# Patient Record
Sex: FEMALE | URBAN - METROPOLITAN AREA
[De-identification: names, ages, dates, MRNs, and addresses within clinical notes are randomized per-mention and may not be internally consistent; named-entity substitution may affect disease eponyms.]

---
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
## 2024-06-20 ENCOUNTER — APPOINTMENT (OUTPATIENT)
Dept: URBAN - METROPOLITAN AREA CLINIC 242 | Age: 24
Setting detail: DERMATOLOGY
End: 2024-06-20

## 2024-06-20 DIAGNOSIS — L91.0 HYPERTROPHIC SCAR: ICD-10-CM

## 2024-06-20 PROCEDURE — OTHER INTRALESIONAL KENALOG: OTHER

## 2024-06-20 PROCEDURE — 99202 OFFICE O/P NEW SF 15 MIN: CPT | Mod: 25

## 2024-06-20 PROCEDURE — OTHER COUNSELING: OTHER

## 2024-06-20 PROCEDURE — 11900 INJECT SKIN LESIONS </W 7: CPT

## 2024-06-20 ASSESSMENT — LOCATION SIMPLE DESCRIPTION DERM: LOCATION SIMPLE: RIGHT FOREARM

## 2024-06-20 ASSESSMENT — LOCATION ZONE DERM: LOCATION ZONE: ARM

## 2024-06-20 ASSESSMENT — LOCATION DETAILED DESCRIPTION DERM: LOCATION DETAILED: RIGHT VENTRAL DISTAL FOREARM

## 2024-06-20 NOTE — PROCEDURE: INTRALESIONAL KENALOG
How Many Mls Were Removed From The 80 Mg/Ml (5ml) Vial When Preparing The Injectable Solution?: 0
Detail Level: Detailed
Medical Necessity Clause: This procedure was medically necessary because the lesions that were treated were:
Total Volume (Ccs): 0.4
Include Z78.9 (Other Specified Conditions Influencing Health Status) As An Associated Diagnosis?: No
Consent: The risks of atrophy were reviewed with the patient.
Lot # For Kenalog (Optional): [id number]
Kenalog Type Of Vial: Multiple Dose
Ndc# For Kenalog Only: 04115-182-42
Expiration Date For Kenalog (Optional): 2025-09
Show Inventory Tab: Hide
Concentration Of Kenalog Solution Injected (Mg/Ml): 40.0
Validate Note Data When Using Inventory: Yes
Kenalog Preparation: Kenalog

## 2024-07-30 ENCOUNTER — APPOINTMENT (OUTPATIENT)
Dept: URBAN - METROPOLITAN AREA CLINIC 242 | Age: 24
Setting detail: DERMATOLOGY
End: 2024-07-30

## 2024-07-30 DIAGNOSIS — L91.0 HYPERTROPHIC SCAR: ICD-10-CM

## 2024-07-30 PROCEDURE — 11900 INJECT SKIN LESIONS </W 7: CPT

## 2024-07-30 PROCEDURE — 99212 OFFICE O/P EST SF 10 MIN: CPT | Mod: 25

## 2024-07-30 PROCEDURE — OTHER MIPS QUALITY: OTHER

## 2024-07-30 PROCEDURE — OTHER INTRALESIONAL KENALOG: OTHER

## 2024-07-30 PROCEDURE — OTHER COUNSELING: OTHER

## 2024-07-30 ASSESSMENT — LOCATION SIMPLE DESCRIPTION DERM: LOCATION SIMPLE: RIGHT FOREARM

## 2024-07-30 ASSESSMENT — LOCATION DETAILED DESCRIPTION DERM: LOCATION DETAILED: RIGHT VENTRAL DISTAL FOREARM

## 2024-07-30 ASSESSMENT — LOCATION ZONE DERM: LOCATION ZONE: ARM

## 2024-07-30 NOTE — PROCEDURE: INTRALESIONAL KENALOG
How Many Mls Were Removed From The 80 Mg/Ml (5ml) Vial When Preparing The Injectable Solution?: 0
Administered By (Optional): Dr. Holbrook
Detail Level: Detailed
Medical Necessity Clause: This procedure was medically necessary because the lesions that were treated were:
Total Volume (Ccs): 0.2
Include Z78.9 (Other Specified Conditions Influencing Health Status) As An Associated Diagnosis?: No
Consent: The risks of atrophy were reviewed with the patient.
Lot # For Kenalog (Optional): [id number]
Kenalog Type Of Vial: Multiple Dose
Ndc# For Kenalog Only: 52280-157-76
Expiration Date For Kenalog (Optional): 2025-09
Show Inventory Tab: Hide
Concentration Of Kenalog Solution Injected (Mg/Ml): 40.0
Validate Note Data When Using Inventory: Yes
Kenalog Preparation: Kenalog

## 2024-08-01 ENCOUNTER — HOSPITAL ENCOUNTER (OUTPATIENT)
Age: 24
Discharge: HOME | End: 2024-08-01
Payer: COMMERCIAL

## 2024-08-01 VITALS
RESPIRATION RATE: 14 BRPM | OXYGEN SATURATION: 97 % | DIASTOLIC BLOOD PRESSURE: 59 MMHG | HEART RATE: 79 BPM | TEMPERATURE: 98 F | HEIGHT: 62 IN | SYSTOLIC BLOOD PRESSURE: 100 MMHG | BODY MASS INDEX: 20.24 KG/M2 | WEIGHT: 110 LBS

## 2024-08-01 DIAGNOSIS — A09 TRAVELER'S DIARRHEA: Primary | ICD-10-CM

## 2024-08-01 LAB
#MXD IC: 0.7 X10ˆ3/UL (ref 0.1–1)
B-HCG UR QL: NEGATIVE
BILIRUB UR QL STRIP: NEGATIVE
BUN BLD-MCNC: <5 MG/DL (ref 7–18)
CHLORIDE BLD-SCNC: 102 MMOL/L (ref 98–112)
CLARITY UR: CLEAR
CO2 BLD-SCNC: 23 MMOL/L (ref 21–32)
COLOR UR: YELLOW
CREAT BLD-MCNC: 0.6 MG/DL
EGFRCR SERPLBLD CKD-EPI 2021: 130 ML/MIN/1.73M2 (ref 60–?)
GLUCOSE BLD-MCNC: 86 MG/DL (ref 70–99)
GLUCOSE UR STRIP-MCNC: NEGATIVE MG/DL
HCT VFR BLD AUTO: 42.4 %
HCT VFR BLD CALC: 43 %
HGB BLD-MCNC: 13.7 G/DL
HGB UR QL STRIP: NEGATIVE
ISTAT IONIZED CALCIUM FOR CHEM 8: 1.19 MMOL/L (ref 1.12–1.32)
KETONES UR STRIP-MCNC: NEGATIVE MG/DL
LYMPHOCYTES # BLD AUTO: 2.9 X10ˆ3/UL (ref 1–4)
LYMPHOCYTES NFR BLD AUTO: 52 %
MCH RBC QN AUTO: 27.5 PG (ref 26–34)
MCHC RBC AUTO-ENTMCNC: 32.3 G/DL (ref 31–37)
MCV RBC AUTO: 85.1 FL (ref 80–100)
MIXED CELL %: 12.1 %
NEUTROPHILS # BLD AUTO: 1.9 X10ˆ3/UL (ref 1.5–7.7)
NEUTROPHILS NFR BLD AUTO: 35.9 %
NITRITE UR QL STRIP: NEGATIVE
PH UR STRIP: 6 [PH]
PLATELET # BLD AUTO: 252 X10ˆ3/UL (ref 150–450)
POTASSIUM BLD-SCNC: 4 MMOL/L (ref 3.6–5.1)
PROT UR STRIP-MCNC: NEGATIVE MG/DL
RBC # BLD AUTO: 4.98 X10ˆ6/UL
SODIUM BLD-SCNC: 139 MMOL/L (ref 136–145)
SP GR UR STRIP: >=1.03
UROBILINOGEN UR STRIP-ACNC: <2 MG/DL
WBC # BLD AUTO: 5.5 X10ˆ3/UL (ref 4–11)

## 2024-08-01 PROCEDURE — 87086 URINE CULTURE/COLONY COUNT: CPT | Performed by: NURSE PRACTITIONER

## 2024-08-01 PROCEDURE — 99204 OFFICE O/P NEW MOD 45 MIN: CPT

## 2024-08-01 PROCEDURE — 36415 COLL VENOUS BLD VENIPUNCTURE: CPT

## 2024-08-01 PROCEDURE — 81025 URINE PREGNANCY TEST: CPT

## 2024-08-01 PROCEDURE — 85025 COMPLETE CBC W/AUTO DIFF WBC: CPT | Performed by: NURSE PRACTITIONER

## 2024-08-01 PROCEDURE — 80047 BASIC METABLC PNL IONIZED CA: CPT

## 2024-08-01 PROCEDURE — 81002 URINALYSIS NONAUTO W/O SCOPE: CPT

## 2024-08-01 RX ORDER — ONDANSETRON 4 MG/1
4 TABLET, ORALLY DISINTEGRATING ORAL EVERY 4 HOURS PRN
Qty: 10 TABLET | Refills: 0 | Status: SHIPPED | OUTPATIENT
Start: 2024-08-01 | End: 2024-08-08

## 2024-08-01 RX ORDER — DICYCLOMINE HCL 20 MG
20 TABLET ORAL 4 TIMES DAILY PRN
Qty: 30 TABLET | Refills: 0 | Status: SHIPPED | OUTPATIENT
Start: 2024-08-01 | End: 2024-08-31

## 2024-08-01 RX ORDER — SODIUM CHLORIDE 9 MG/ML
1000 INJECTION, SOLUTION INTRAVENOUS ONCE
Status: COMPLETED | OUTPATIENT
Start: 2024-08-01 | End: 2024-08-01

## 2024-08-01 NOTE — ED INITIAL ASSESSMENT (HPI)
Diarrhea started on Sunday, has taken immodium and symptoms subside but return after a day. Pt states she had a fever on Sunday during her flight back from the [facility]. Had one emesis on Sunday, abdomin was tender and hard on Sunday.

## 2024-08-01 NOTE — ED PROVIDER NOTES
Patient Seen in: [facility]      History   No chief complaint on file.    Stated Complaint: Diarhea    Subjective:   24-year-old female presents today with complaints of diarrhea.  States having about 5 loose stools a day.  Has had some relief with Imodium but as soon as the Imodium wears off diarrhea returns.  Has abdominal cramping with diarrhea.  Did have nausea vomiting on Sunday with a soft for started but has not had any since.  Has had decreased appetite.  Symptoms started after returning from the [facility].  No 1 else in the group became ill.  Patient denies any fever or chills.  No urinary or vaginal symptoms.  Patient is alert orientated x 3.  No other symptoms or concerns.  The patient's medication list, past medical history and social history elements as listed in today's nurse's notes were reviewed and agreed (except as otherwise stated in the HPI).  The patient's family history reviewed and determined to be noncontributory to the presenting problem            Objective:   History reviewed. No pertinent past medical history.           Past Surgical History:   Procedure Laterality Date    Cyst removal Right 2017    Cyst removed from wrist                Social History     Socioeconomic History    Marital status: Single   Tobacco Use    Smoking status: Never    Smokeless tobacco: Never   Vaping Use    Vaping status: Never Used   Substance and Sexual Activity    Alcohol use: Never    Drug use: Never    Sexual activity: Yes     Partners: Male     Birth control/protection: Pill              Review of Systems    Positive for stated Chief Complaint: No chief complaint on file.    Other systems are as noted in HPI.  Constitutional and vital signs reviewed.      All other systems reviewed and negative except as noted above.    Physical Exam     ED Triage Vitals   BP 08/01/24 1236 100/59   Pulse 08/01/24 1236 79   Resp 08/01/24 1236 14   Temp 08/01/24 1236 98.3 °F (36.8 °C)   Temp src 08/01/24  1236 Oral   SpO2 08/01/24 1236 97 %   O2 Device 08/01/24 1235 None (Room air)       Current Vitals:   Vital Signs  BP: 100/59  Pulse: 79  Resp: 14  Temp: 98.3 °F (36.8 °C)  Temp src: Oral    Oxygen Therapy  SpO2: 97 %  O2 Device: None (Room air)            Physical Exam  Vitals and nursing note reviewed.   Constitutional:       Appearance: Normal appearance.   HENT:      Head: Normocephalic.      Mouth/Throat:      Mouth: Mucous membranes are moist.   Cardiovascular:      Rate and Rhythm: Normal rate.   Pulmonary:      Effort: Pulmonary effort is normal.      Breath sounds: Normal breath sounds.   Abdominal:      General: Abdomen is flat. Bowel sounds are normal. There is no distension.      Palpations: Abdomen is soft.      Tenderness: There is generalized abdominal tenderness.   Skin:     General: Skin is warm and dry.   Neurological:      Mental Status: She is alert and oriented to person, place, and time.               ED Course     Labs Reviewed   [facility] POCT URINALYSIS DIPSTICK - Abnormal; Notable for the following components:       Result Value    Leukocyte esterase urine Small (*)     All other components within normal limits   POCT ISTAT CHEM8 CARTRIDGE - Abnormal; Notable for the following components:    ISTAT BUN <5 (*)     All other components within normal limits   POCT PREGNANCY URINE - Normal   POCT CBC   URINE CULTURE, ROUTINE                      MDM     Please note that this report has been produced using speech recognition software and may contain errors related to that system including, but not limited to, errors in grammar, punctuation, and spelling, as well as words and phrases that possibly may have been recognized inappropriately.  If there are any questions or concerns, contact the dictating provider for clarification.                                         Medical Decision Making  Differential diagnosis includes but is not limited to: Ova parasite, C. difficile, viral gastroenteritis,  traveler's diarrhea, hepatitis A      Presents today with complaints of diarrhea 3-5 times a day described as loose to liquid stool.  History of nausea vomiting but has since resolved.  Intermittent abdominal cramping with bowel movements.  Abdominal exam was soft mildly tender to all 4 quadrants.  No guarding.  IV was established 0.9 normal saline IV fluid bolus was started.  CBC i-STAT both showed no acute findings.  Urine dip showed small leukocytes.  Will send urine culture.  Patient was unable to give stool sample here in the office will send outpatient order for patient to complete.  Discussed patient with Dr. Marko OSUNA, ordered patient be started on a prophylactic antibiotic treatment.  Will start Cipro to take as directed.  Patient also given prescription for Bentyl and Zofran.  Diet restrictions given.  Encouraged to push fluids to include Pedialyte or Gatorade.  Follow with primary care physician or gastroenterology 1 week if symptoms do not improve.  Patient and mom both verbalized understanding and agreed to plan of care.          Amount and/or Complexity of Data Reviewed  Labs: ordered. Decision-making details documented in ED Course.     Details: CBC  I-STAT  Urine dip  Urine culture  Stool culture    Risk  OTC drugs.  Prescription drug management.        Disposition and Plan     Clinical Impression:  1. Traveler's diarrhea         Disposition:  Discharge  8/1/2024  1:23 pm    Follow-up:  Monisha Murphy MD  [address]  [facility] [id number] [phone]    In 1 week  As needed    Dru Pacheco MD  [address] [place] [address], [address]  [facility] [address]  [phone]      As needed          Medications Prescribed:  Current Discharge Medication List        START taking these medications    Details   dicyclomine 20 MG Oral Tab Take 1 tablet (20 mg total) by mouth 4 (four) times daily as needed.  Qty: 30 tablet, Refills: 0      ondansetron 4 MG Oral Tablet Dispersible Take 1 tablet  (4 mg total) by mouth every 4 (four) hours as needed for Nausea.  Qty: 10 tablet, Refills: 0

## 2025-02-25 ENCOUNTER — HOSPITAL ENCOUNTER (OUTPATIENT)
Dept: GENERAL RADIOLOGY | Age: 25
Discharge: HOME | End: 2025-02-25
Attending: INTERNAL MEDICINE
Payer: COMMERCIAL

## 2025-02-25 ENCOUNTER — EKG ENCOUNTER (OUTPATIENT)
Dept: LAB | Age: 25
End: 2025-02-25
Attending: INTERNAL MEDICINE
Payer: COMMERCIAL

## 2025-02-25 ENCOUNTER — OFFICE VISIT (OUTPATIENT)
Dept: INTERNAL MEDICINE CLINIC | Facility: CLINIC | Age: 25
End: 2025-02-25
Payer: COMMERCIAL

## 2025-02-25 VITALS
OXYGEN SATURATION: 98 % | DIASTOLIC BLOOD PRESSURE: 61 MMHG | TEMPERATURE: 98 F | HEIGHT: 61.5 IN | BODY MASS INDEX: 20.91 KG/M2 | HEART RATE: 72 BPM | WEIGHT: 112.19 LBS | RESPIRATION RATE: 12 BRPM | SYSTOLIC BLOOD PRESSURE: 105 MMHG

## 2025-02-25 DIAGNOSIS — Z01.818 PREOPERATIVE EXAMINATION: ICD-10-CM

## 2025-02-25 DIAGNOSIS — Z01.818 PREOPERATIVE EXAMINATION: Primary | ICD-10-CM

## 2025-02-25 LAB
ATRIAL RATE: 75 BPM
P AXIS: 67 DEGREES
P-R INTERVAL: 126 MS
Q-T INTERVAL: 402 MS
QRS DURATION: 68 MS
QTC CALCULATION (BEZET): 448 MS
R AXIS: 60 DEGREES
T AXIS: 30 DEGREES
VENTRICULAR RATE: 75 BPM

## 2025-02-25 PROCEDURE — 99203 OFFICE O/P NEW LOW 30 MIN: CPT | Performed by: INTERNAL MEDICINE

## 2025-02-25 PROCEDURE — 71046 X-RAY EXAM CHEST 2 VIEWS: CPT | Performed by: INTERNAL MEDICINE

## 2025-02-25 PROCEDURE — 3078F DIAST BP <80 MM HG: CPT | Performed by: INTERNAL MEDICINE

## 2025-02-25 PROCEDURE — 3008F BODY MASS INDEX DOCD: CPT | Performed by: INTERNAL MEDICINE

## 2025-02-25 PROCEDURE — 3074F SYST BP LT 130 MM HG: CPT | Performed by: INTERNAL MEDICINE

## 2025-02-25 PROCEDURE — 93010 ELECTROCARDIOGRAM REPORT: CPT | Performed by: INTERNAL MEDICINE

## 2025-02-25 PROCEDURE — 93005 ELECTROCARDIOGRAM TRACING: CPT

## 2025-02-25 RX ORDER — NORELGESTROMIN AND ETHINYL ESTRADIOL 35; 150 UG/MG; UG/MG
PATCH TRANSDERMAL
COMMUNITY

## 2025-02-25 NOTE — PROGRESS NOTES
Anuj Carcamo is a 24 year old female who presents for a pre-operative physical exam.   Anuj Carcamo is scheduled for a rhinoplasty procedure in the [facility] on 3/12/25.  HPI related to surgery:   She  has had previous anesthesia:  Yes.  Previous complications:  No  Patient has good functional status (>4 METS).  No active anginal symptoms, no history of arrhythmias, coronary artery disease, valvular disease.    Patient to have rhinoplasty.    Currently on estradiol patches.  No other prescription medications.  Not on over the counter medications.    REVIEW OF SYSTEMS:   GENERAL/ const: no fevers/chills. feels well, no weight loss  SKIN: denies any unusual skin lesions  EYES:no vision problems  HEENT: denies sinus pain or sinus tenderness  LUNGS: denies shortness of breath   CARDIOVASCULAR: denies chest pain  GI: denies nausea/emesis/ abdominal pain diarrhea constipation  : denies dysuria   MUSCULOSKELETAL: no arthalgias  NEURO: denies headaches  PSYCHIATRIC: denies issues  ENDOCRINE: no hot/cold intolerance  ALLERGY: Allergies[1]  PAST HISTORY:     Current Outpatient Medications:     Norelgestromin-Eth Estradiol 150-35 MCG/24HR Transdermal Patch Weekly, USE PATCHES WEEKLY CONTINUOUSLY SKIPPING WEEKS OFF, Disp: , Rfl:   Medical:  has no past medical history on file.  Surgical:  has a past surgical history that includes cyst removal (Right, 2017).  Family: family history includes Hypertension in her maternal grandfather; Stroke in her maternal grandfather.  Social:   Social History     Socioeconomic History    Marital status: Single   Tobacco Use    Smoking status: Never    Smokeless tobacco: Never   Vaping Use    Vaping status: Never Used   Substance and Sexual Activity    Alcohol use: Never    Drug use: Never    Sexual activity: Yes     Partners: Male     Birth control/protection: Pill   Other Topics Concern    Caffeine Concern Yes     Comment: Sometimes coffee    Exercise No     Social Drivers of Health      Food Insecurity: No Food Insecurity (2/25/2025)    NCSS - Food Insecurity     Worried About Running Out of Food in the Last Year: No     Ran Out of Food in the Last Year: No   Transportation Needs: No Transportation Needs (2/25/2025)    NCSS - Transportation     Lack of Transportation: No   Housing Stability: Not At Risk (2/25/2025)    NCSS - Housing/Utilities     Has Housing: Yes     Worried About Losing Housing: No     Unable to Get Utilities: No     PHYSICAL EXAM:   /61 (BP Location: Left arm, Patient Position: Sitting, Cuff Size: adult)   Pulse 72   Temp 98.1 °F (36.7 °C) (Temporal)   Resp 12   Ht 5' 1.5\" (1.562 m)   Wt 112 lb 3.2 oz (50.9 kg)   LMP 01/03/2025 (Exact Date)   SpO2 98%   Breastfeeding No   BMI 20.86 kg/m²    GENERAL: Alert and oriented, well developed, well nourished,in no apparent distress  SKIN: no rashes,no suspicious lesions  HEENT: atraumatic, PERRLA, EOMI, normal lid and conjunctiva  NECK: supple, no jvd, no thyromegaly  LUNGS: clear to auscultation bilaterally, no wheezing/rubs  CARDIO: RRR without murmurs.  No clubbing, cyanosis or edema.  GI: soft non tender nondistended no hepatosplenomegaly, bowel sounds throughout  NEURO: CN II-XII intact  MS: Full ROM, no joint pain  PSYCH: pleasant, appropriate mood and affect  LABORATORY DATA:   Labs, EKG, CXR pending  ASSESSMENT AND PLAN:   1.  Pre-operative examination  Patient presents for pre-operative examination at the request of performing surgeon.  Patient has good functional status (>4 METS).  No active anginal symptoms, no history of arrhythmias, coronary artery disease, valvular disease.  RCRI score of 0,  Class I risk, 3.9% risk of major cardiac event.  She is considered a low risk patient undergoing an intermediate risk procedure, and is ok to proceed with surgery.  - CBC With Differential With Platelet  - Comp Metabolic Panel (14)  - PROTHROMBIN TIME (PT) [8847] [Q]  - PTT, ACTIVATED [763] [Q]  - XR CHEST PA + LAT  CHEST (CPT=71046); Future  - EKG 12 Lead; Future    Patient Care Team:  Monisha Murphy MD as PCP - General (PEDIATRICS)  Nghia Bañuelos MD (OBSTETRICS & GYNECOLOGY)  The patient indicates understanding of these issues and agrees to the plan.  The patient is asked to return to clinic in 12 months for follow up on chronic issues/CPX, or earlier if acute issues arise.  Hiwot Jones MD          [1] No Known Allergies

## 2025-02-25 NOTE — PATIENT INSTRUCTIONS
- Get blood tests done today at [email or web address] labs (do not have to be fasting)  - Schedule EKG and chest x-ray appointments through [facility] or call central scheduling at [phone]. You can check across the brewer to see if they have any x-ray and EKG appointments open this afternoon/right now.  - Do not take any over the counter medications for 1 week prior to surgery.    It was a pleasure seeing you in the clinic today.  Thank you for choosing the [facility] [place] office for your healthcare needs. Please call at [phone] with any questions or concerns.    Hiwot Jones MD

## 2025-02-26 ENCOUNTER — TELEPHONE (OUTPATIENT)
Dept: INTERNAL MEDICINE CLINIC | Facility: CLINIC | Age: 25
End: 2025-02-26

## 2025-02-26 LAB
ABSOLUTE BASOPHILS: 47 CELLS/UL (ref 0–200)
ABSOLUTE EOSINOPHILS: 158 CELLS/UL (ref 15–500)
ABSOLUTE LYMPHOCYTES: 1762 CELLS/UL (ref 850–3900)
ABSOLUTE MONOCYTES: 221 CELLS/UL (ref 200–950)
ABSOLUTE NEUTROPHILS: 5712 CELLS/UL (ref 1500–7800)
ALBUMIN/GLOBULIN RATIO: 1.5 (CALC) (ref 1–2.5)
ALBUMIN: 4.1 G/DL (ref 3.6–5.1)
ALKALINE PHOSPHATASE: 54 U/L (ref 31–125)
ALT: 7 U/L (ref 6–29)
AST: 11 U/L (ref 10–30)
BASOPHILS: 0.6 %
BILIRUBIN, TOTAL: 0.3 MG/DL (ref 0.2–1.2)
BUN: 7 MG/DL (ref 7–25)
CALCIUM: 9.2 MG/DL (ref 8.6–10.2)
CARBON DIOXIDE: 26 MMOL/L (ref 20–32)
CHLORIDE: 102 MMOL/L (ref 98–110)
CREATININE: 0.79 MG/DL (ref 0.5–0.96)
EGFR: 108 ML/MIN/1.73M2
EOSINOPHILS: 2 %
GLOBULIN: 2.8 G/DL (CALC) (ref 1.9–3.7)
GLUCOSE: 123 MG/DL (ref 65–99)
HEMATOCRIT: 38.9 % (ref 35–45)
HEMOGLOBIN: 12.3 G/DL (ref 11.7–15.5)
INR: 1
LYMPHOCYTES: 22.3 %
MCH: 28.3 PG (ref 27–33)
MCHC: 31.6 G/DL (ref 32–36)
MCV: 89.4 FL (ref 80–100)
MONOCYTES: 2.8 %
MPV: 10.7 FL (ref 7.5–12.5)
NEUTROPHILS: 72.3 %
PARTIAL THROMBOPLASTIN$TIME, ACTIVATED: 27 SEC (ref 23–32)
PLATELET COUNT: 274 THOUSAND/UL (ref 140–400)
POTASSIUM: 3.9 MMOL/L (ref 3.5–5.3)
PROTEIN, TOTAL: 6.9 G/DL (ref 6.1–8.1)
PT: 10.5 SEC (ref 9–11.5)
RDW: 12.4 % (ref 11–15)
RED BLOOD CELL COUNT: 4.35 MILLION/UL (ref 3.8–5.1)
SODIUM: 137 MMOL/L (ref 135–146)
WHITE BLOOD CELL COUNT: 7.9 THOUSAND/UL (ref 3.8–10.8)

## 2025-02-26 NOTE — TELEPHONE ENCOUNTER
Pt's mother Christine is wanting to request a new cardio ref since pt was referred to [facility] where the soonest appt is 3/3 and pt is flying out for surg on 3/5.    Christine is wanting to see if another cardio has a sooner appt. Can a new ref be placed/  Please advise.

## 2025-02-26 NOTE — TELEPHONE ENCOUNTER
Spoke to [facility] and this RN was unable to schedule patient since patient is under her parent's insurance and I do not have parents information.    LMTCB- [phone]      Duration- 10 minutes.      [employer] message sent

## 2025-02-26 NOTE — TELEPHONE ENCOUNTER
Spoke to [facility] to see if a sooner appointment can be scheduled. Per [facility] the soonest appointment is mid March and the providers are not able to double book and patient is not in the system and did not schedule an appointment.     Dr. Jones- Any further recommendations? Originally [facility] had a 3/3 appointment, but they did not schedule. 3/3 Is no longer available. Please advise. TY

## 2025-02-26 NOTE — TELEPHONE ENCOUNTER
She could try [facility], [facility] or [facility].  We can fax over EKG if she schedules with one of those groups    [facility]  [address]  [address]  [place], [place] [id number] [phone]  I believe they have other locations, not sure if same phone number    [facility]  [address] [facility]  [address]  [place] [address]  Office: [phone]    [facility]  [address]  [address]  [place]  [address] [phone]

## (undated) NOTE — LETTER
Date & Time: 8/1/2024, 1:33 PM  Patient: Anuj Carcamo  Encounter Provider(s):    Braden Hadley APRN       To Whom It May Concern:    Anuj Carcamo was seen and treated in our department on 8/1/2024. She may return to work once symptoms have improved and remains fever free for 24 hours.    If you have any questions or concerns, please do not hesitate to call.        _____________________________  Physician/APC Signature